# Patient Record
Sex: FEMALE | Race: BLACK OR AFRICAN AMERICAN | URBAN - METROPOLITAN AREA
[De-identification: names, ages, dates, MRNs, and addresses within clinical notes are randomized per-mention and may not be internally consistent; named-entity substitution may affect disease eponyms.]

---

## 2017-04-19 ENCOUNTER — OFFICE VISIT (OUTPATIENT)
Dept: PRIMARY CARE CLINIC | Age: 37
End: 2017-04-19

## 2017-04-19 VITALS
BODY MASS INDEX: 45.99 KG/M2 | HEART RATE: 90 BPM | OXYGEN SATURATION: 100 % | HEIGHT: 67 IN | DIASTOLIC BLOOD PRESSURE: 81 MMHG | WEIGHT: 293 LBS | SYSTOLIC BLOOD PRESSURE: 129 MMHG | RESPIRATION RATE: 14 BRPM | TEMPERATURE: 97.6 F

## 2017-04-19 DIAGNOSIS — Z00.00 PREVENTATIVE HEALTH CARE: Primary | ICD-10-CM

## 2017-04-19 DIAGNOSIS — D50.0 IRON DEFICIENCY ANEMIA DUE TO CHRONIC BLOOD LOSS: ICD-10-CM

## 2017-04-19 DIAGNOSIS — Z86.39 HX OF TYPE 2 DIABETES MELLITUS: ICD-10-CM

## 2017-04-19 DIAGNOSIS — R07.9 CHEST PAIN, UNSPECIFIED TYPE: ICD-10-CM

## 2017-04-19 DIAGNOSIS — Z72.0 TOBACCO ABUSE: ICD-10-CM

## 2017-04-19 DIAGNOSIS — Z00.00 PREVENTATIVE HEALTH CARE: ICD-10-CM

## 2017-04-19 DIAGNOSIS — R49.0 VOICE HOARSENESS: ICD-10-CM

## 2017-04-19 DIAGNOSIS — Z12.4 CERVICAL CANCER SCREENING: ICD-10-CM

## 2017-04-19 PROBLEM — D50.9 IRON DEFICIENCY ANEMIA: Status: ACTIVE | Noted: 2017-04-19

## 2017-04-19 LAB
ALBUMIN SERPL-MCNC: 3.1 G/DL (ref 3.4–5)
ALP BLD-CCNC: 64 U/L (ref 40–129)
ALT SERPL-CCNC: 6 U/L (ref 10–40)
ANION GAP SERPL CALCULATED.3IONS-SCNC: 13 MMOL/L (ref 3–16)
AST SERPL-CCNC: 16 U/L (ref 15–37)
BILIRUB SERPL-MCNC: 0.4 MG/DL (ref 0–1)
BILIRUBIN DIRECT: <0.2 MG/DL (ref 0–0.3)
BILIRUBIN, INDIRECT: ABNORMAL MG/DL (ref 0–1)
BILIRUBIN, POC: NORMAL
BLOOD URINE, POC: NORMAL
BUN BLDV-MCNC: 6 MG/DL (ref 7–20)
CALCIUM SERPL-MCNC: 7.9 MG/DL (ref 8.3–10.6)
CHLORIDE BLD-SCNC: 104 MMOL/L (ref 99–110)
CHOLESTEROL, TOTAL: 113 MG/DL (ref 0–199)
CLARITY, POC: CLEAR
CO2: 23 MMOL/L (ref 21–32)
COLOR, POC: YELLOW
CREAT SERPL-MCNC: <0.5 MG/DL (ref 0.6–1.1)
CREATININE URINE: 98.3 MG/DL (ref 28–259)
GFR AFRICAN AMERICAN: >60
GFR NON-AFRICAN AMERICAN: >60
GLUCOSE BLD-MCNC: 87 MG/DL (ref 70–99)
GLUCOSE URINE, POC: NORMAL
HDLC SERPL-MCNC: 25 MG/DL (ref 40–60)
HEPATITIS C ANTIBODY INTERPRETATION: NORMAL
INR BLD: 0.95 (ref 0.85–1.15)
KETONES, POC: NORMAL
LDL CHOLESTEROL CALCULATED: 75 MG/DL
LEUKOCYTE EST, POC: NORMAL
MICROALBUMIN UR-MCNC: <1.2 MG/DL
MICROALBUMIN/CREAT UR-RTO: NORMAL MG/G (ref 0–30)
NITRITE, POC: NORMAL
PH, POC: 7
PHOSPHORUS: 3.1 MG/DL (ref 2.5–4.9)
POTASSIUM SERPL-SCNC: 4.5 MMOL/L (ref 3.5–5.1)
PROTEIN, POC: NORMAL
PROTHROMBIN TIME: 10.7 SEC (ref 9.6–13)
REASON FOR REJECTION: NORMAL
REJECTED TEST: NORMAL
SODIUM BLD-SCNC: 140 MMOL/L (ref 136–145)
SPECIFIC GRAVITY, POC: 1.01
T4 FREE: 0.9 NG/DL (ref 0.9–1.8)
TOTAL PROTEIN: 6 G/DL (ref 6.4–8.2)
TRIGL SERPL-MCNC: 64 MG/DL (ref 0–150)
TSH REFLEX: 10.23 UIU/ML (ref 0.27–4.2)
UROBILINOGEN, POC: 0.2
VLDLC SERPL CALC-MCNC: 13 MG/DL

## 2017-04-19 PROCEDURE — 93000 ELECTROCARDIOGRAM COMPLETE: CPT | Performed by: INTERNAL MEDICINE

## 2017-04-19 PROCEDURE — 99385 PREV VISIT NEW AGE 18-39: CPT | Performed by: INTERNAL MEDICINE

## 2017-04-19 RX ORDER — BUPROPION HYDROCHLORIDE 150 MG/1
150 TABLET, EXTENDED RELEASE ORAL 2 TIMES DAILY
Qty: 60 TABLET | Refills: 3 | Status: SHIPPED | OUTPATIENT
Start: 2017-04-19 | End: 2017-06-01 | Stop reason: SDUPTHER

## 2017-04-19 RX ORDER — FUROSEMIDE 40 MG/1
20 TABLET ORAL
COMMUNITY
Start: 2017-02-21 | End: 2017-04-19 | Stop reason: ALTCHOICE

## 2017-04-19 ASSESSMENT — PATIENT HEALTH QUESTIONNAIRE - PHQ9
1. LITTLE INTEREST OR PLEASURE IN DOING THINGS: 1
2. FEELING DOWN, DEPRESSED OR HOPELESS: 1
SUM OF ALL RESPONSES TO PHQ QUESTIONS 1-9: 2
SUM OF ALL RESPONSES TO PHQ9 QUESTIONS 1 & 2: 2

## 2017-04-19 ASSESSMENT — ENCOUNTER SYMPTOMS
BLOOD IN STOOL: 0
ABDOMINAL PAIN: 0
NAUSEA: 0
SHORTNESS OF BREATH: 1
DIARRHEA: 0
VOMITING: 0
COUGH: 1
CONSTIPATION: 0

## 2017-04-20 ENCOUNTER — TELEPHONE (OUTPATIENT)
Dept: PRIMARY CARE CLINIC | Age: 37
End: 2017-04-20

## 2017-04-20 DIAGNOSIS — E03.8 SUBCLINICAL HYPOTHYROIDISM: Primary | ICD-10-CM

## 2017-04-20 DIAGNOSIS — D64.9 ANEMIA, UNSPECIFIED TYPE: ICD-10-CM

## 2017-04-20 LAB
ESTIMATED AVERAGE GLUCOSE: 122.6 MG/DL
HBA1C MFR BLD: 5.9 %

## 2017-04-20 RX ORDER — LEVOTHYROXINE SODIUM 0.07 MG/1
75 TABLET ORAL DAILY
Qty: 30 TABLET | Refills: 3 | Status: SHIPPED | OUTPATIENT
Start: 2017-04-20 | End: 2017-06-01 | Stop reason: SDUPTHER

## 2017-04-21 LAB — HIV-1 AND HIV-2 ANTIBODIES: NEGATIVE

## 2017-05-04 ENCOUNTER — OFFICE VISIT (OUTPATIENT)
Dept: PRIMARY CARE CLINIC | Age: 37
End: 2017-05-04

## 2017-05-04 VITALS
RESPIRATION RATE: 14 BRPM | TEMPERATURE: 97.6 F | OXYGEN SATURATION: 98 % | BODY MASS INDEX: 45.99 KG/M2 | HEIGHT: 67 IN | HEART RATE: 90 BPM | SYSTOLIC BLOOD PRESSURE: 122 MMHG | DIASTOLIC BLOOD PRESSURE: 70 MMHG | WEIGHT: 293 LBS

## 2017-05-04 DIAGNOSIS — E03.8 SUBCLINICAL HYPOTHYROIDISM: ICD-10-CM

## 2017-05-04 DIAGNOSIS — Z87.891 FORMER SMOKER: ICD-10-CM

## 2017-05-04 DIAGNOSIS — R06.01 ORTHOPNEA: Primary | ICD-10-CM

## 2017-05-04 DIAGNOSIS — R07.9 CHEST PAIN, UNSPECIFIED TYPE: ICD-10-CM

## 2017-05-04 PROBLEM — Z72.0 TOBACCO ABUSE: Status: RESOLVED | Noted: 2017-04-19 | Resolved: 2017-05-04

## 2017-05-04 PROBLEM — Z86.39 HX OF TYPE 2 DIABETES MELLITUS: Status: RESOLVED | Noted: 2017-04-19 | Resolved: 2017-05-04

## 2017-05-04 PROCEDURE — 99213 OFFICE O/P EST LOW 20 MIN: CPT | Performed by: INTERNAL MEDICINE

## 2017-05-04 ASSESSMENT — ENCOUNTER SYMPTOMS
ABDOMINAL PAIN: 0
ABDOMINAL DISTENTION: 0
VOMITING: 0
SORE THROAT: 0
CHEST TIGHTNESS: 0
SHORTNESS OF BREATH: 1
COUGH: 0
NAUSEA: 0

## 2017-05-12 ENCOUNTER — HOSPITAL ENCOUNTER (OUTPATIENT)
Dept: NON INVASIVE DIAGNOSTICS | Age: 37
Discharge: HOME | End: 2017-05-12
Attending: INTERNAL MEDICINE | Admitting: INTERNAL MEDICINE

## 2017-05-12 DIAGNOSIS — R06.01 ORTHOPNEA: ICD-10-CM

## 2017-05-12 LAB
LV EF: 63 %
LVEF MODALITY: NORMAL

## 2017-05-16 ENCOUNTER — TELEPHONE (OUTPATIENT)
Dept: PULMONOLOGY | Age: 37
End: 2017-05-16

## 2017-06-01 ENCOUNTER — OFFICE VISIT (OUTPATIENT)
Dept: PRIMARY CARE CLINIC | Age: 37
End: 2017-06-01

## 2017-06-01 VITALS
DIASTOLIC BLOOD PRESSURE: 78 MMHG | WEIGHT: 290 LBS | BODY MASS INDEX: 45.42 KG/M2 | OXYGEN SATURATION: 98 % | HEART RATE: 89 BPM | RESPIRATION RATE: 18 BRPM | TEMPERATURE: 98.7 F | SYSTOLIC BLOOD PRESSURE: 116 MMHG

## 2017-06-01 DIAGNOSIS — E03.8 SUBCLINICAL HYPOTHYROIDISM: ICD-10-CM

## 2017-06-01 DIAGNOSIS — Z72.0 TOBACCO ABUSE: ICD-10-CM

## 2017-06-01 DIAGNOSIS — R09.81 SINUS CONGESTION: Primary | ICD-10-CM

## 2017-06-01 PROBLEM — R49.0 VOICE HOARSENESS: Status: RESOLVED | Noted: 2017-04-19 | Resolved: 2017-06-01

## 2017-06-01 PROBLEM — R07.9 CHEST PAIN: Status: RESOLVED | Noted: 2017-04-19 | Resolved: 2017-06-01

## 2017-06-01 PROBLEM — R06.01 ORTHOPNEA: Status: RESOLVED | Noted: 2017-05-04 | Resolved: 2017-06-01

## 2017-06-01 PROCEDURE — 99213 OFFICE O/P EST LOW 20 MIN: CPT | Performed by: INTERNAL MEDICINE

## 2017-06-01 RX ORDER — LEVOTHYROXINE SODIUM 0.07 MG/1
75 TABLET ORAL DAILY
Qty: 30 TABLET | Refills: 3 | Status: SHIPPED | OUTPATIENT
Start: 2017-06-01 | End: 2018-09-10 | Stop reason: SDUPTHER

## 2017-06-01 RX ORDER — FLUTICASONE PROPIONATE 50 MCG
1 SPRAY, SUSPENSION (ML) NASAL DAILY
Qty: 1 BOTTLE | Refills: 3 | Status: SHIPPED | OUTPATIENT
Start: 2017-06-01 | End: 2018-09-10

## 2017-06-01 RX ORDER — PSEUDOEPHEDRINE HYDROCHLORIDE 30 MG/1
30 TABLET ORAL EVERY 4 HOURS PRN
Qty: 60 TABLET | Refills: 1 | Status: SHIPPED | OUTPATIENT
Start: 2017-06-01 | End: 2018-09-10

## 2017-06-01 RX ORDER — CETIRIZINE HYDROCHLORIDE 10 MG/1
10 TABLET ORAL DAILY
Qty: 30 TABLET | Refills: 3 | Status: SHIPPED | OUTPATIENT
Start: 2017-06-01

## 2017-06-01 RX ORDER — BUPROPION HYDROCHLORIDE 150 MG/1
150 TABLET, EXTENDED RELEASE ORAL 2 TIMES DAILY
Qty: 60 TABLET | Refills: 3 | Status: SHIPPED | OUTPATIENT
Start: 2017-06-01 | End: 2017-12-18 | Stop reason: SDUPTHER

## 2017-06-01 ASSESSMENT — ENCOUNTER SYMPTOMS
VOMITING: 0
DIARRHEA: 0
SINUS PRESSURE: 1
COUGH: 0
RHINORRHEA: 0
CONSTIPATION: 0
SORE THROAT: 0
NAUSEA: 0
SHORTNESS OF BREATH: 0
TROUBLE SWALLOWING: 0

## 2017-12-18 ENCOUNTER — OFFICE VISIT (OUTPATIENT)
Dept: PRIMARY CARE CLINIC | Age: 37
End: 2017-12-18

## 2017-12-18 VITALS
BODY MASS INDEX: 44.26 KG/M2 | WEIGHT: 282 LBS | TEMPERATURE: 98.1 F | SYSTOLIC BLOOD PRESSURE: 124 MMHG | HEART RATE: 88 BPM | OXYGEN SATURATION: 98 % | HEIGHT: 67 IN | DIASTOLIC BLOOD PRESSURE: 80 MMHG

## 2017-12-18 DIAGNOSIS — Z72.0 TOBACCO ABUSE: ICD-10-CM

## 2017-12-18 DIAGNOSIS — M54.9 BACK PAIN, UNSPECIFIED BACK LOCATION, UNSPECIFIED BACK PAIN LATERALITY, UNSPECIFIED CHRONICITY: ICD-10-CM

## 2017-12-18 DIAGNOSIS — F41.9 ANXIETY: ICD-10-CM

## 2017-12-18 DIAGNOSIS — Z12.4 CERVICAL CANCER SCREENING: ICD-10-CM

## 2017-12-18 DIAGNOSIS — R49.0 VOICE HOARSENESS: ICD-10-CM

## 2017-12-18 DIAGNOSIS — D64.9 ANEMIA, UNSPECIFIED TYPE: Primary | ICD-10-CM

## 2017-12-18 DIAGNOSIS — D64.9 ANEMIA, UNSPECIFIED TYPE: ICD-10-CM

## 2017-12-18 PROBLEM — E03.1 CONGENITAL HYPOTHYROIDISM WITHOUT GOITER: Status: ACTIVE | Noted: 2017-12-12

## 2017-12-18 PROBLEM — R06.09 DYSPNEA ON EXERTION: Status: ACTIVE | Noted: 2017-12-17

## 2017-12-18 PROBLEM — R07.89 CHEST TIGHTNESS: Status: ACTIVE | Noted: 2017-12-17

## 2017-12-18 LAB
BASOPHILS ABSOLUTE: 0 K/UL (ref 0–0.2)
BASOPHILS RELATIVE PERCENT: 0.1 %
EOSINOPHILS ABSOLUTE: 0 K/UL (ref 0–0.6)
EOSINOPHILS RELATIVE PERCENT: 0.5 %
HCT VFR BLD CALC: 31.3 % (ref 36–48)
HEMOGLOBIN: 9.6 G/DL (ref 12–16)
LYMPHOCYTES ABSOLUTE: 2.6 K/UL (ref 1–5.1)
LYMPHOCYTES RELATIVE PERCENT: 31.9 %
MCH RBC QN AUTO: 25.2 PG (ref 26–34)
MCHC RBC AUTO-ENTMCNC: 30.6 G/DL (ref 31–36)
MCV RBC AUTO: 82.2 FL (ref 80–100)
MONOCYTES ABSOLUTE: 1.1 K/UL (ref 0–1.3)
MONOCYTES RELATIVE PERCENT: 14.1 %
NEUTROPHILS ABSOLUTE: 4.3 K/UL (ref 1.7–7.7)
NEUTROPHILS RELATIVE PERCENT: 53.4 %
PDW BLD-RTO: 25.4 % (ref 12.4–15.4)
PLATELET # BLD: 371 K/UL (ref 135–450)
PMV BLD AUTO: 9.5 FL (ref 5–10.5)
RBC # BLD: 3.81 M/UL (ref 4–5.2)
WBC # BLD: 8.1 K/UL (ref 4–11)

## 2017-12-18 PROCEDURE — 99214 OFFICE O/P EST MOD 30 MIN: CPT | Performed by: INTERNAL MEDICINE

## 2017-12-18 RX ORDER — TIZANIDINE HYDROCHLORIDE 2 MG/1
2 CAPSULE, GELATIN COATED ORAL 3 TIMES DAILY PRN
Qty: 90 CAPSULE | Refills: 3 | Status: SHIPPED | OUTPATIENT
Start: 2017-12-18 | End: 2017-12-19

## 2017-12-18 RX ORDER — FERROUS SULFATE 325(65) MG
325 TABLET ORAL
COMMUNITY
Start: 2017-12-13

## 2017-12-18 RX ORDER — ASPIRIN 325 MG
325-650 TABLET ORAL
COMMUNITY
End: 2018-09-10

## 2017-12-18 RX ORDER — BUPROPION HYDROCHLORIDE 150 MG/1
150 TABLET, EXTENDED RELEASE ORAL 2 TIMES DAILY
Qty: 60 TABLET | Refills: 3 | Status: SHIPPED | OUTPATIENT
Start: 2017-12-18 | End: 2018-09-10

## 2017-12-18 RX ORDER — DOCUSATE SODIUM 100 MG/1
100 CAPSULE, LIQUID FILLED ORAL DAILY PRN
Qty: 60 CAPSULE | Refills: 3 | Status: SHIPPED | OUTPATIENT
Start: 2017-12-18

## 2017-12-18 RX ORDER — IPRATROPIUM BROMIDE AND ALBUTEROL SULFATE 2.5; .5 MG/3ML; MG/3ML
2.5 SOLUTION RESPIRATORY (INHALATION)
COMMUNITY
Start: 2017-12-16

## 2017-12-18 RX ORDER — ALBUTEROL SULFATE 90 UG/1
2 AEROSOL, METERED RESPIRATORY (INHALATION)
COMMUNITY
Start: 2017-12-18

## 2017-12-18 ASSESSMENT — ENCOUNTER SYMPTOMS
BACK PAIN: 1
COUGH: 0
VOMITING: 0
DIARRHEA: 0
ABDOMINAL PAIN: 0
SHORTNESS OF BREATH: 1
CONSTIPATION: 0
NAUSEA: 0

## 2017-12-18 NOTE — PROGRESS NOTES
eating. 30 tablet 3    buPROPion (WELLBUTRIN SR) 150 MG extended release tablet Take 1 tablet by mouth 2 times daily 60 tablet 3    pseudoephedrine (SUDAFED) 30 MG tablet Take 1 tablet by mouth every 4 hours as needed for Congestion 60 tablet 1    fluticasone (FLONASE) 50 MCG/ACT nasal spray 1 spray by Nasal route daily 1 Bottle 3    cetirizine (ZYRTEC) 10 MG tablet Take 1 tablet by mouth daily 30 tablet 3    nicotine (NICODERM CQ) 7 MG/24HR Place 1 patch onto the skin every 24 hours 30 patch 3    aspirin 325 MG tablet Take 325-650 mg by mouth       No current facility-administered medications for this visit. No Known Allergies    Review of Systems   Constitutional: Positive for fatigue. Negative for chills and fever. HENT:        Chronic voice hoarseness     Eyes: Negative for visual disturbance. Respiratory: Positive for shortness of breath (with exertion ). Negative for cough. Cardiovascular: Negative for chest pain and leg swelling. Gastrointestinal: Negative for abdominal pain, constipation, diarrhea, nausea and vomiting. Musculoskeletal: Positive for back pain. Skin: Negative for rash. Neurological: Negative for dizziness, light-headedness and headaches. Psychiatric/Behavioral: The patient is nervous/anxious. Vitals:    12/18/17 1048   BP: 124/80   Pulse: 88   Temp: 98.1 °F (36.7 °C)   SpO2: 98%   Weight: 282 lb (127.9 kg)   Height: 5' 7\" (1.702 m)       Physical Exam   Constitutional: She is oriented to person, place, and time. She appears well-developed and well-nourished. No distress. HENT:   Head: Normocephalic and atraumatic. Nose: Nose normal.   Mouth/Throat: No oropharyngeal exudate. Eyes: Conjunctivae and EOM are normal. Pupils are equal, round, and reactive to light. Right eye exhibits no discharge. Left eye exhibits no discharge. Neck: Normal range of motion. Neck supple. Cardiovascular: Normal rate, regular rhythm and normal heart sounds.   Exam reveals no gallop and no friction rub. No murmur heard. Pulmonary/Chest: Effort normal and breath sounds normal. No respiratory distress. She has no wheezes. Abdominal: Soft. Bowel sounds are normal. She exhibits no distension. There is no tenderness. There is no rebound. Musculoskeletal: Normal range of motion. She exhibits no edema or tenderness. Neurological: She is alert and oriented to person, place, and time. No cranial nerve deficit. Skin: Skin is warm and dry. No rash noted. She is not diaphoretic. No erythema. Psychiatric: She has a normal mood and affect. Her behavior is normal.   Vitals reviewed. Assessment:  Anika Lobo is a 44 y.o. female, with a history of anemia, tobacco abuse, hx of type 2 diabetes, who presents for an acute visit. Plan:       1. Anemia:     -continue iron supplement  -have prescribed docusate as needed for constipation  -continue to monitor for sources of blood loss, may need EGD in future if hemoglobin level remain low   - CBC Auto Differential; Future    2. Anxiety:  Suspect that chest tightness and breathing issues may be partially related to anxiety and panic attacks. - buPROPion (WELLBUTRIN SR) 150 MG extended release tablet; Take 1 tablet by mouth 2 times daily  Dispense: 60 tablet; Refill: 3    3. Tobacco abuse    -strongly recommended completed smoking cessation   - nicotine (NICODERM CQ) 7 MG/24HR; Place 1 patch onto the skin every 24 hours  Dispense: 30 patch; Refill: 3  - buPROPion (WELLBUTRIN SR) 150 MG extended release tablet; Take 1 tablet by mouth 2 times daily  Dispense: 60 tablet; Refill: 3    4. Back pain, unspecified back location, unspecified back pain laterality, unspecified chronicity    - tiZANidine (ZANAFLEX) 2 MG capsule; Take 1 capsule by mouth 3 times daily as needed for Muscle spasms (back pain)  Dispense: 90 capsule; Refill: 3  - [facility]    5.  Voice hoarseness  -recommended smoking cessation   - [facility] Juan R Oliver, MD ([facility])    6. Cervical cancer screening    - Evelyn Robertson MD        Return in about 2 months (around 2/18/2018) for tobacco use, anemia, anxiety .

## 2017-12-19 RX ORDER — TIZANIDINE 2 MG/1
2 TABLET ORAL EVERY 8 HOURS PRN
Qty: 90 TABLET | Refills: 1 | Status: SHIPPED | OUTPATIENT
Start: 2017-12-19 | End: 2018-09-10 | Stop reason: SDUPTHER

## 2017-12-19 NOTE — PROGRESS NOTES
Pt's pharmacy denied coverage of Zanaflex capsule, but will pay for tablet. Have sent new prescription to pt's pharmacy.

## 2018-09-10 ENCOUNTER — OFFICE VISIT (OUTPATIENT)
Dept: PRIMARY CARE CLINIC | Age: 38
End: 2018-09-10

## 2018-09-10 VITALS
HEIGHT: 67 IN | DIASTOLIC BLOOD PRESSURE: 52 MMHG | BODY MASS INDEX: 41.91 KG/M2 | TEMPERATURE: 97.8 F | WEIGHT: 267 LBS | OXYGEN SATURATION: 96 % | SYSTOLIC BLOOD PRESSURE: 76 MMHG | HEART RATE: 64 BPM

## 2018-09-10 DIAGNOSIS — F32.1 CURRENT MODERATE EPISODE OF MAJOR DEPRESSIVE DISORDER, UNSPECIFIED WHETHER RECURRENT (HCC): ICD-10-CM

## 2018-09-10 DIAGNOSIS — G62.9 PERIPHERAL POLYNEUROPATHY: ICD-10-CM

## 2018-09-10 DIAGNOSIS — E03.8 SUBCLINICAL HYPOTHYROIDISM: ICD-10-CM

## 2018-09-10 DIAGNOSIS — G89.29 CHRONIC MIDLINE THORACIC BACK PAIN: ICD-10-CM

## 2018-09-10 DIAGNOSIS — I42.8 NONISCHEMIC CARDIOMYOPATHY (HCC): Primary | ICD-10-CM

## 2018-09-10 DIAGNOSIS — M54.6 CHRONIC MIDLINE THORACIC BACK PAIN: ICD-10-CM

## 2018-09-10 DIAGNOSIS — Z00.00 PREVENTATIVE HEALTH CARE: ICD-10-CM

## 2018-09-10 PROBLEM — I50.21 ACUTE SYSTOLIC CHF (CONGESTIVE HEART FAILURE), NYHA CLASS 4 (HCC): Status: ACTIVE | Noted: 2018-08-11

## 2018-09-10 PROBLEM — R06.09 DYSPNEA ON EXERTION: Status: RESOLVED | Noted: 2017-12-17 | Resolved: 2018-09-10

## 2018-09-10 PROBLEM — E66.01 MORBID OBESITY WITH BMI OF 40.0-44.9, ADULT (HCC): Status: ACTIVE | Noted: 2018-08-10

## 2018-09-10 PROBLEM — D50.9 IRON DEFICIENCY ANEMIA: Status: RESOLVED | Noted: 2017-04-19 | Resolved: 2018-09-10

## 2018-09-10 PROBLEM — Z87.891 FORMER SMOKER: Status: RESOLVED | Noted: 2017-05-04 | Resolved: 2018-09-10

## 2018-09-10 PROBLEM — R49.0 VOICE HOARSENESS: Status: RESOLVED | Noted: 2017-04-19 | Resolved: 2018-09-10

## 2018-09-10 PROCEDURE — 96160 PT-FOCUSED HLTH RISK ASSMT: CPT | Performed by: INTERNAL MEDICINE

## 2018-09-10 PROCEDURE — 99495 TRANSJ CARE MGMT MOD F2F 14D: CPT | Performed by: INTERNAL MEDICINE

## 2018-09-10 RX ORDER — ASPIRIN 81 MG/1
81 TABLET, CHEWABLE ORAL DAILY
Qty: 30 TABLET | Refills: 3 | Status: SHIPPED | OUTPATIENT
Start: 2018-09-10

## 2018-09-10 RX ORDER — PSEUDOEPHEDRINE HYDROCHLORIDE 30 MG/1
30 TABLET ORAL EVERY 4 HOURS PRN
Qty: 60 TABLET | Refills: 1 | Status: SHIPPED | OUTPATIENT
Start: 2018-09-10

## 2018-09-10 RX ORDER — FUROSEMIDE 40 MG/1
40 TABLET ORAL DAILY
Qty: 30 TABLET | Refills: 3 | Status: SHIPPED | OUTPATIENT
Start: 2018-09-10

## 2018-09-10 RX ORDER — CARVEDILOL 3.12 MG/1
3.12 TABLET ORAL 2 TIMES DAILY WITH MEALS
Qty: 60 TABLET | Refills: 3 | Status: SHIPPED | OUTPATIENT
Start: 2018-09-10

## 2018-09-10 RX ORDER — AMOXICILLIN 250 MG
1 CAPSULE ORAL
COMMUNITY

## 2018-09-10 RX ORDER — LISINOPRIL 2.5 MG/1
2.5 TABLET ORAL DAILY
COMMUNITY
Start: 2018-08-14 | End: 2018-09-10 | Stop reason: SDUPTHER

## 2018-09-10 RX ORDER — CARVEDILOL 3.12 MG/1
TABLET ORAL
COMMUNITY
Start: 2018-08-14 | End: 2018-09-10 | Stop reason: SDUPTHER

## 2018-09-10 RX ORDER — ATORVASTATIN CALCIUM 40 MG/1
TABLET, FILM COATED ORAL
COMMUNITY
Start: 2018-08-13

## 2018-09-10 RX ORDER — ASPIRIN 81 MG/1
81 TABLET, CHEWABLE ORAL
COMMUNITY
Start: 2018-08-14 | End: 2018-09-10 | Stop reason: SDUPTHER

## 2018-09-10 RX ORDER — FUROSEMIDE 40 MG/1
40 TABLET ORAL
COMMUNITY
Start: 2018-08-13 | End: 2018-09-10 | Stop reason: SDUPTHER

## 2018-09-10 RX ORDER — FLUOXETINE 20 MG/1
20 TABLET, FILM COATED ORAL DAILY
Qty: 30 TABLET | Refills: 3 | Status: SHIPPED | OUTPATIENT
Start: 2018-09-10

## 2018-09-10 RX ORDER — SPIRONOLACTONE 25 MG/1
25 TABLET ORAL
COMMUNITY
Start: 2018-08-30 | End: 2018-09-10

## 2018-09-10 RX ORDER — LEVOTHYROXINE SODIUM 0.07 MG/1
75 TABLET ORAL DAILY
Qty: 30 TABLET | Refills: 3 | Status: SHIPPED | OUTPATIENT
Start: 2018-09-10

## 2018-09-10 RX ORDER — LISINOPRIL 2.5 MG/1
2.5 TABLET ORAL DAILY
Qty: 30 TABLET | Refills: 3 | Status: SHIPPED | OUTPATIENT
Start: 2018-09-10

## 2018-09-10 RX ORDER — TIZANIDINE 4 MG/1
4 TABLET ORAL EVERY 8 HOURS PRN
Qty: 60 TABLET | Refills: 3 | Status: SHIPPED | OUTPATIENT
Start: 2018-09-10

## 2018-09-10 RX ORDER — BLOOD PRESSURE TEST KIT
KIT MISCELLANEOUS
Qty: 1 KIT | Refills: 0 | Status: SHIPPED | OUTPATIENT
Start: 2018-09-10

## 2018-09-10 ASSESSMENT — ENCOUNTER SYMPTOMS
DIARRHEA: 0
NAUSEA: 0
BACK PAIN: 1
SHORTNESS OF BREATH: 0
COUGH: 0
ABDOMINAL PAIN: 0
VOMITING: 0
CONSTIPATION: 0

## 2018-09-10 ASSESSMENT — PATIENT HEALTH QUESTIONNAIRE - PHQ9
2. FEELING DOWN, DEPRESSED OR HOPELESS: 2
7. TROUBLE CONCENTRATING ON THINGS, SUCH AS READING THE NEWSPAPER OR WATCHING TELEVISION: 1
SUM OF ALL RESPONSES TO PHQ QUESTIONS 1-9: 14
1. LITTLE INTEREST OR PLEASURE IN DOING THINGS: 2
6. FEELING BAD ABOUT YOURSELF - OR THAT YOU ARE A FAILURE OR HAVE LET YOURSELF OR YOUR FAMILY DOWN: 1
9. THOUGHTS THAT YOU WOULD BE BETTER OFF DEAD, OR OF HURTING YOURSELF: 1
SUM OF ALL RESPONSES TO PHQ QUESTIONS 1-9: 14
5. POOR APPETITE OR OVEREATING: 2
3. TROUBLE FALLING OR STAYING ASLEEP: 2
4. FEELING TIRED OR HAVING LITTLE ENERGY: 2
SUM OF ALL RESPONSES TO PHQ9 QUESTIONS 1 & 2: 4
8. MOVING OR SPEAKING SO SLOWLY THAT OTHER PEOPLE COULD HAVE NOTICED. OR THE OPPOSITE, BEING SO FIGETY OR RESTLESS THAT YOU HAVE BEEN MOVING AROUND A LOT MORE THAN USUAL: 1
10. IF YOU CHECKED OFF ANY PROBLEMS, HOW DIFFICULT HAVE THESE PROBLEMS MADE IT FOR YOU TO DO YOUR WORK, TAKE CARE OF THINGS AT HOME, OR GET ALONG WITH OTHER PEOPLE: 2

## 2018-09-10 NOTE — PROGRESS NOTES
for Congestion 60 tablet 1    fluticasone (FLONASE) 50 MCG/ACT nasal spray 1 spray by Nasal route daily 1 Bottle 3    cetirizine (ZYRTEC) 10 MG tablet Take 1 tablet by mouth daily 30 tablet 3        Medications patient taking as of now reconciled against medications ordered at time of hospital discharge: No    Chief Complaint   Patient presents with   [address] from Hospital     seen/admitted [facility] and dischg'd 8/30/18 for CHF; depression        HPI  Ms. Ignacio Reina is a 43year old female with a history of CHF, tobacco abuse, and anemia, who was admitted to [facility] from 8/28 to 8/30 for a new onset CHF exacerabation. Her EF was 20% during a recent cardiac catherization. Her cardiac catheratization did not show any evidence of CAD. This was her second hospital visit in August.  She was previously admitted in early August for chest pain, leg swelling, and dyspnea. The CHF was diagnosed at that first admission. Since her initial admission, the pt was started five BP medications: lisinopril, coreg, spironolactone, and lasix. Ms. Ignacio Reina is having dizziness as her blood pressure has been running low at home. Pt has been following her 1500 mL fluid restriction and sodium restriction. She feels back to normal.  .     Depression and anixiety: Ms. Ignacio Reina has been having ongoing problems with depression and anxiety, but her recent health issues have made it worse. She is tearful at today's visit. Back Pain:  Ms. Ignacio Reina also complains of severe pain in her mid thoracic back and between her shoulder blades. 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 The pain has been present for 2 years. It has been getting worse over the past few months. Inpatient course: Discharge summary reviewed- see chart. Interval history/Current status: see above     Review of Systems   Constitutional: Negative for chills, fatigue and fever. Eyes: Negative for visual disturbance.

## 2018-11-05 ENCOUNTER — TELEPHONE (OUTPATIENT)
Dept: PRIMARY CARE CLINIC | Age: 38
End: 2018-11-05

## 2018-12-05 ENCOUNTER — CLINICAL DOCUMENTATION (OUTPATIENT)
Dept: PRIMARY CARE CLINIC | Age: 38
End: 2018-12-05